# Patient Record
Sex: MALE
[De-identification: names, ages, dates, MRNs, and addresses within clinical notes are randomized per-mention and may not be internally consistent; named-entity substitution may affect disease eponyms.]

---

## 2021-02-05 PROBLEM — Z00.00 ENCOUNTER FOR PREVENTIVE HEALTH EXAMINATION: Status: ACTIVE | Noted: 2021-02-05

## 2021-02-05 PROCEDURE — 36415 COLL VENOUS BLD VENIPUNCTURE: CPT

## 2021-02-05 PROCEDURE — 99072 ADDL SUPL MATRL&STAF TM PHE: CPT

## 2023-08-15 ENCOUNTER — APPOINTMENT (OUTPATIENT)
Dept: PSYCHIATRY | Facility: CLINIC | Age: 33
End: 2023-08-15
Payer: COMMERCIAL

## 2023-08-15 DIAGNOSIS — Z86.59 PERSONAL HISTORY OF OTHER MENTAL AND BEHAVIORAL DISORDERS: ICD-10-CM

## 2023-08-15 DIAGNOSIS — Z78.9 OTHER SPECIFIED HEALTH STATUS: ICD-10-CM

## 2023-08-15 DIAGNOSIS — F12.90 CANNABIS USE, UNSPECIFIED, UNCOMPLICATED: ICD-10-CM

## 2023-08-15 DIAGNOSIS — Z83.49 FAMILY HISTORY OF OTHER ENDOCRINE, NUTRITIONAL AND METABOLIC DISEASES: ICD-10-CM

## 2023-08-15 PROCEDURE — 90791 PSYCH DIAGNOSTIC EVALUATION: CPT | Mod: 95

## 2023-08-15 NOTE — RISK ASSESSMENT
[Clinical Interview] : Clinical Interview [No] : No [Yes] : yes [de-identified] : Pt denies owning a gun

## 2023-08-22 ENCOUNTER — APPOINTMENT (OUTPATIENT)
Dept: PSYCHIATRY | Facility: CLINIC | Age: 33
End: 2023-08-22
Payer: COMMERCIAL

## 2023-08-22 PROCEDURE — 90837 PSYTX W PT 60 MINUTES: CPT | Mod: 95

## 2023-08-22 NOTE — REASON FOR VISIT
[Patient preference] : as per patient preference [Access issues (e.g., transportation, impaired mobility, etc.)] : due to patient's access issues [Telehealth (audio & video) - Individual/Group] : This visit was provided via telehealth using real-time 2-way audio visual technology. [Other Location: e.g. Home (Enter Location, City,State)___] : The provider was located at [unfilled]. [Home] : The patient, [unfilled], was located at home, [unfilled], at the time of the visit. [Verbal consent obtained from patient/other participant(s)] : Verbal consent for telehealth/telephonic services obtained from patient/other participant(s) [Patient] : Patient

## 2023-08-22 NOTE — PLAN
[FreeTextEntry2] : Complete ESTEBAN  ERP for Obsessive Compulsive Disorder  [Cognitive and/or Behavior Therapy] : Cognitive and/or Behavior Therapy  [de-identified] : Psychologist met with Pt using teletherapy platform. Pt was at their home, while psychologist was working remotely via [facility]. Pt continued to provide consent for telehealth services. Goals of the session were to continue ESTEBAN differential diagnostic interview and provide psychoeducation. Affect was observed to be full ranges, and Mood was reported as "okay." Pt was responsive, receptive, and engaged throughout session. Evidence of risk was neither observed nor reported.  Writer continued ESTEBAN-5 interview. Pt endorsed excessive, difficult to control worries specific to his wife's fidelity and daughter's paternity, accompanied by moderate to very severe restlessness, irritability, and muscle tension, although mild reassurance seeking. While worried, he reported feeling unable to separate from his family and commute to work, choosing to work remotely. He reported spending "100%" of the day worrying over the past month, and the worries had been present since 09/2022. These led to mile interference but severe to very severe distress. Pt also reported worries about separation from his mother during middle school, for fear that she would be harmed, as well as contamination fears throughout high school.   Pt reported unwanted obsessive "paranoid" sexual thoughts/images regarding his wife's infidelity, despite lack of evidence. He reported numerous behaviors to neutralize the worries, including chores, TV, and exercising 60-90 minutes daily to make sure he looked his best so his wife would not cheat.  triggers for obsessive thoughts included things being out of place at home. These worries occupied "100%" of his thoughts, and strength of the beliefs at 20%. Compulsions , including distractions, occupied "80%" of the day. Pt acknowledged that the obsessions and compulsions were nonsensical, and he was able to resist compulsions 20% of the time. Pt reported that food was a trigger for increased exercise, that if he did not, he worried that his wife would leave him. These symptoms consistent with OCD led to moderate to very severe interference and distress, beginning in 09/2022  Homework: Complete ASRS Adult ADHD questionnaire, and Y-BOCS  [FreeTextEntry1] : Continue ESTEBAN 5 with specific phobia section  for Obsessive Compulsive Disorder  Psychoeducation about Diagnosis & Treatment Model  1. Pt will learn about their symptoms and diagnosis and be able to explain it back to psychologist. 2. Pt will learn about the CBT model and be able to accurately categorize their symptoms and diagnosis in terms of cognition, emotional responses, behaviors, and physiological arousal.  Exposure Therapy Interventions 1. Pt can benefit from learning/continuing exposure therapy interventions, involving confronting situational, emotional, physiological, and cognitive avoided situations.  2. Pt can benefit from reducing and eliminating safety behaviors/objects, such as maladaptive response/ritualization prevention, removing "just in case" items that maintain anxiety, panic, obsessive, and post-traumatic symptoms.  3. Exposure therapy involves any or all of these modalities: in vivo, imaginal, interoceptive, Virtual Reality, written/narrative, and analog in vivo exposure 4. Exposures will be conducted within psychotherapy sessions and assigned for homework between sessions  Cognitive Therapy Interventions  1. Pt can benefit from learning & practicing cognitive restructuring, including learning to categorize automatic thoughts/cognitive distortions (e.g. shoulds, catastrophic thoughts, overgeneralizations), judgments, biases, and attributions.  2. Pt can benefit from using cognitive reframing to challenge cognitive distortions to increase flexibility in thinking. 3. Pt can benefit from using self-coping statements to encourage themselves and provide validation.  4. Cognitive restructuring will be learned and practiced within sessions, and assigned for homework between sessions. This will entail interventions such using mood monitoring and thought record forms, to challenge cognitive distortions in real time.

## 2023-08-29 ENCOUNTER — APPOINTMENT (OUTPATIENT)
Dept: PSYCHIATRY | Facility: CLINIC | Age: 33
End: 2023-08-29
Payer: COMMERCIAL

## 2023-08-29 PROCEDURE — 90837 PSYTX W PT 60 MINUTES: CPT | Mod: 95

## 2023-08-29 NOTE — PLAN
[Cognitive and/or Behavior Therapy] : Cognitive and/or Behavior Therapy  [FreeTextEntry2] : Complete ESTEBAN  ERP for Obsessive Compulsive Disorder  BAT vs. CBT for Depressive Symptoms [de-identified] : Psychologist met with Pt using teletherapy platform. Pt was at their home, while psychologist was working remotely via [facility]. Pt continued to provide consent for telehealth services. Goals of the session were to complete ESTEBAN differential diagnostic interview and provide psychoeducation. Affect was observed to be full ranged, and Mood was reported as "okay." Pt was responsive, receptive, and engaged throughout session. Evidence of risk was neither observed nor reported.  Writer reviewed homework with Pt, who completed the ASRS adult ADHD measure and YBOCS. Pt did not score in clinical range for either measure.   Writer completed ESTEBAN-5 interview. Pt denied symptoms of hypomania/theodore, psychosis, eating disorders, illness anxiety & somatoform disorders, PTSD, and specific phobias. Pt endorsed history of substance use, alcohol and marijuana, as well as Adderall, from 08/22 to 01/23, when depressive symptoms were the worst. Pt opined that his previous diagnosis of ADHD was not valid, and discontinued Adderall due to sleep disruption. Writer elicited further information about Pt's and wife's upbringing, cultural and Taoist affiliations, and division of labor in the home.  Psychoeducation provided about treatments for OCD and depression. Treatment planning begun.   Homework: consider treating OCD vs depression first.   [FreeTextEntry1] : for Obsessive Compulsive Disorder  Psychoeducation about Diagnosis & Treatment Model  1. Pt will learn about their symptoms and diagnosis and be able to explain it back to psychologist. 2. Pt will learn about the CBT model and be able to accurately categorize their symptoms and diagnosis in terms of cognition, emotional responses, behaviors, and physiological arousal.  Exposure Therapy Interventions 1. Pt can benefit from learning/continuing exposure therapy interventions, involving confronting situational, emotional, physiological, and cognitive avoided situations.  2. Pt can benefit from reducing and eliminating safety behaviors/objects, such as maladaptive response/ritualization prevention, removing "just in case" items that maintain anxiety, panic, obsessive, and post-traumatic symptoms.  3. Exposure therapy involves any or all of these modalities: in vivo, imaginal, interoceptive, Virtual Reality, written/narrative, and analog in vivo exposure 4. Exposures will be conducted within psychotherapy sessions and assigned for homework between sessions  Cognitive Therapy Interventions  1. Pt can benefit from learning & practicing cognitive restructuring, including learning to categorize automatic thoughts/cognitive distortions (e.g. shoulds, catastrophic thoughts, overgeneralizations), judgments, biases, and attributions.  2. Pt can benefit from using cognitive reframing to challenge cognitive distortions to increase flexibility in thinking. 3. Pt can benefit from using self-coping statements to encourage themselves and provide validation.  4. Cognitive restructuring will be learned and practiced within sessions, and assigned for homework between sessions. This will entail interventions such using mood monitoring and thought record forms, to challenge cognitive distortions in real time.      for Depressive symptoms Psychoeducation about Diagnosis & Treatment Model  1. Pt will learn about their symptoms and diagnosis and be able to explain it back to psychologist. 2. Pt will learn about the CBT model and be able to accurately categorize their symptoms and diagnosis in terms of cognition, emotional responses, behaviors, and physiological arousal.   Behavioral Activation 1. Pt can benefit from values clarification 2. Pt can benefit from increasing frequency, intensity, and duration of enjoyed and meaningful activities, such as socialization and recreational activities.  3. Pt will schedule enjoyable and meaningful activities on realistically frequent basis   Cognitive Therapy Interventions  1. Pt can benefit from learning & practicing cognitive restructuring, including learning to categorize automatic thoughts/cognitive distortions (e.g. shoulds, catastrophic thoughts, overgeneralizations), judgments, biases, and attributions.  2. Pt can benefit from using cognitive reframing to challenge cognitive distortions to increase flexibility in thinking. 3. Pt can benefit from using self-coping statements to encourage themselves and provide validation.  4. Cognitive restructuring will be learned and practiced within sessions, and assigned for homework between sessions. This will entail interventions such using mood monitoring and thought record forms, to challenge cognitive distortions in real time.

## 2023-08-30 NOTE — PSYCHOSOCIAL ASSESSMENT
[All substances negative except as specified below] : All substances negative except as specified below [_____] : Route: [unfilled] [FreeTextEntry1] : PT

## 2023-08-30 NOTE — DISCUSSION/SUMMARY
[FreeTextEntry1] : Psychologist engaged with Pt and built rapport, explaining confidentiality & its limits, assessment and psychotherapy processes & phases. Writer reviewed attendance policy  and benefits of regular CBT sessions. Answered Pt's questions about his training and experience, as well as limits of expertise. Explained that assessment did not guarantee treatment, if Pt's diagnosis was beyond scope of practice or provider's expertise. Provided brief psychoeducation about CBT model, and described it as a collaborative, empowering process, that gives Pt's tools with which to become their own therapist Explained that.CBT involves goal setting, subgoal analysis, and motivation building. Explained that both in session, didactics and skill building would occur, and that homework was an important component to implementation of CBT with real-world situations and stressors. Psychologist described treatment matching approach based on symptoms and collaboratively selecting best evidence-based treatments (EBT's) for Pt's diagnoses. Psychologist answered questions about assessment, psychotherapy, and CBT model/conceptualization.   Pt reported moderate to severe levels of depressive, anxiety, and OCD related symptoms on the DSM5 Cross Cutting Measure. He scored a 17 on the GAD7, severe anxiety, and an 11 on the PHQ9 (moderate depression).  Psychologist began administration of ESTEBAN-5 (Antonio & Mercy, 2014) differential diagnostic interview. Pt endorsed moderate to severe depressed mood, (irritability), loss of interest or pleasure, and difficulties thinking/concentrating/decision making. These were frequent and led to severe distress and interference. Although full dx criteria for Major Depressive Disorder were not met, conceptualized at Other Specific Depressive Disorder.   Pt denied current symptoms of Social Anxiety Disorder & separation anxiety disorder. He endorsed limited symptom panic attacks (usually <=3 symptoms) heart racing, chills/heat sensations, feeling detached, fear of losing control, and fear of going crazy). He reported ~20 LSA's per month for the past 6 months. Pt viewed these as peaks of anxiety as opposed to full panic attacks, denied worry about future attacks, and denied agoraphobic avoidance.   Plan: Complete ESTEBAN   For Depressive Disorder: Psychoeducation about Diagnosis & Treatment Model  1. Pt will learn about their symptoms and diagnosis and be able to explain it back to psychologist. 2. Pt will learn about the CBT model and be able to accurately categorize their symptoms and diagnosis in terms of cognition, emotional responses, behaviors, and physiological arousal.  Behavioral Activation 1. Pt can benefit from values clarification 2. Pt can benefit from increasing frequency, intensity, and duration of enjoyed and meaningful activities, such as socialization and recreational activities.  3. Pt will schedule enjoyable and meaningful activities on realistically frequent basis   Cognitive Therapy Interventions  1. Pt can benefit from learning & practicing cognitive restructuring, including learning to categorize automatic thoughts/cognitive distortions (e.g. shoulds, catastrophic thoughts, overgeneralizations), judgments, biases, and attributions.  2. Pt can benefit from using cognitive reframing to challenge cognitive distortions to increase flexibility in thinking. 3. Pt can benefit from using self-coping statements to encourage themselves and provide validation.  4. Cognitive restructuring will be learned and practiced within sessions, and assigned for homework between sessions. This will entail interventions such using mood monitoring and thought record forms, to challenge cognitive distortions in real time.

## 2023-08-30 NOTE — HISTORY OF PRESENT ILLNESS
[FreeTextEntry1] : Pt reports long history of anxiety dating back to age 13-14 in 8th grade, ~2003, concerns about mother's safety at the time, followed by fears of germs throughout high school. Most recently, from 11/23 to present, Pt reported fears of wife's infidelity and of losing daughter, despite complete absence of evidence.Pt also reported depressive symptoms, starting in 08/23.  Pt attributed current symptoms due to trying for second child.  [FreeTextEntry2] : ADHD, Adderall discontinued in 02/23; Wellbutrin since 05/23  Anxiety

## 2023-08-30 NOTE — REASON FOR VISIT
[Patient preference] : as per patient preference [Telehealth (audio & video) - Individual/Group] : This visit was provided via telehealth using real-time 2-way audio visual technology. [Other Location: e.g. Home (Enter Location, City,State)___] : The provider was located at [unfilled]. [Home] : The patient, [unfilled], was located at home, [unfilled], at the time of the visit. [Verbal consent obtained from patient/other participant(s)] : Verbal consent for telehealth/telephonic services obtained from patient/other participant(s) [Not Applicable] : Not Applicable [Patient] : Patient [FreeTextEntry4] : 4:00 [FreeTextEntry5] : 5:00 [FreeTextEntry2] : "Anxiety & Depression."

## 2023-09-05 ENCOUNTER — APPOINTMENT (OUTPATIENT)
Dept: PSYCHIATRY | Facility: CLINIC | Age: 33
End: 2023-09-05
Payer: COMMERCIAL

## 2023-09-05 PROCEDURE — 90837 PSYTX W PT 60 MINUTES: CPT | Mod: 95

## 2023-09-05 NOTE — PLAN
[Cognitive and/or Behavior Therapy] : Cognitive and/or Behavior Therapy  [FreeTextEntry2] : ERP for Obsessive Compulsive Disorder  BAT vs. CBT for Depressive Symptoms [de-identified] : Psychologist met with Pt using teletherapy platform. Pt was at their home, while psychologist was working remotely via [facility]. Pt continued to provide consent for telehealth services. Goals of the session were to conduct treatment planning, provide psychoeducation, and begin fear hierarchy. Affect was observed to be anxious and Mood was reported as "okay." Pt was responsive, receptive, and engaged throughout session. Evidence of risk was neither observed nor reported.  Writer continued treatment planning with Pt. Pt chose to focus on OCD over depressive symptoms. Psychoeducation provided about CBT model of mood regulation, and Pt provided applications to his experiences with OCD and depression. Psychoeducation provided regarding OCD, it's symptoms, and function of compulsions (escape/avoidance) regarding relief from anxiety & disgust. Concepts of thought action fusion & opposite action introduced. Fear hierarchy construction began regarding triggers for obsessions and compulsions.  Homework: purchase treatments that work workbook on ERP for OCD (Darrion, 2012); add items to fear hierarchy  [FreeTextEntry1] : for Obsessive Compulsive Disorder  Psychoeducation about Diagnosis & Treatment Model  1. Pt will learn about their symptoms and diagnosis and be able to explain it back to psychologist. 2. Pt will learn about the CBT model and be able to accurately categorize their symptoms and diagnosis in terms of cognition, emotional responses, behaviors, and physiological arousal.  Exposure Therapy Interventions 1. Pt can benefit from learning/continuing exposure therapy interventions, involving confronting situational, emotional, physiological, and cognitive avoided situations.  2. Pt can benefit from reducing and eliminating safety behaviors/objects, such as maladaptive response/ritualization prevention, removing "just in case" items that maintain anxiety, panic, obsessive, and post-traumatic symptoms.  3. Exposure therapy involves any or all of these modalities: in vivo, imaginal, interoceptive, Virtual Reality, written/narrative, and analog in vivo exposure 4. Exposures will be conducted within psychotherapy sessions and assigned for homework between sessions  Cognitive Therapy Interventions  1. Pt can benefit from learning & practicing cognitive restructuring, including learning to categorize automatic thoughts/cognitive distortions (e.g. shoulds, catastrophic thoughts, overgeneralizations), judgments, biases, and attributions.  2. Pt can benefit from using cognitive reframing to challenge cognitive distortions to increase flexibility in thinking. 3. Pt can benefit from using self-coping statements to encourage themselves and provide validation.  4. Cognitive restructuring will be learned and practiced within sessions, and assigned for homework between sessions. This will entail interventions such using mood monitoring and thought record forms, to challenge cognitive distortions in real time.      for Depressive symptoms Psychoeducation about Diagnosis & Treatment Model  1. Pt will learn about their symptoms and diagnosis and be able to explain it back to psychologist. 2. Pt will learn about the CBT model and be able to accurately categorize their symptoms and diagnosis in terms of cognition, emotional responses, behaviors, and physiological arousal.   Behavioral Activation 1. Pt can benefit from values clarification 2. Pt can benefit from increasing frequency, intensity, and duration of enjoyed and meaningful activities, such as socialization and recreational activities.  3. Pt will schedule enjoyable and meaningful activities on realistically frequent basis   Cognitive Therapy Interventions  1. Pt can benefit from learning & practicing cognitive restructuring, including learning to categorize automatic thoughts/cognitive distortions (e.g. shoulds, catastrophic thoughts, overgeneralizations), judgments, biases, and attributions.  2. Pt can benefit from using cognitive reframing to challenge cognitive distortions to increase flexibility in thinking. 3. Pt can benefit from using self-coping statements to encourage themselves and provide validation.  4. Cognitive restructuring will be learned and practiced within sessions, and assigned for homework between sessions. This will entail interventions such using mood monitoring and thought record forms, to challenge cognitive distortions in real time.

## 2023-09-05 NOTE — PLAN
[Cognitive and/or Behavior Therapy] : Cognitive and/or Behavior Therapy  [FreeTextEntry2] : ERP for Obsessive Compulsive Disorder  BAT vs. CBT for Depressive Symptoms [de-identified] : Psychologist met with Pt using teletherapy platform. Pt was at their home, while psychologist was working remotely via [facility]. Pt continued to provide consent for telehealth services. Goals of the session were to conduct treatment planning, provide psychoeducation, and begin fear hierarchy. Affect was observed to be anxious and Mood was reported as "okay." Pt was responsive, receptive, and engaged throughout session. Evidence of risk was neither observed nor reported.  Writer continued treatment planning with Pt. Pt chose to focus on OCD over depressive symptoms. Psychoeducation provided about CBT model of mood regulation, and Pt provided applications to his experiences with OCD and depression. Psychoeducation provided regarding OCD, it's symptoms, and function of compulsions (escape/avoidance) regarding relief from anxiety & disgust. Concepts of thought action fusion & opposite action introduced. Fear hierarchy construction began regarding triggers for obsessions and compulsions.  Homework: purchase treatments that work workbook on ERP for OCD (Darrion, 2012); add items to fear hierarchy  [FreeTextEntry1] : for Obsessive Compulsive Disorder  Psychoeducation about Diagnosis & Treatment Model  1. Pt will learn about their symptoms and diagnosis and be able to explain it back to psychologist. 2. Pt will learn about the CBT model and be able to accurately categorize their symptoms and diagnosis in terms of cognition, emotional responses, behaviors, and physiological arousal.  Exposure Therapy Interventions 1. Pt can benefit from learning/continuing exposure therapy interventions, involving confronting situational, emotional, physiological, and cognitive avoided situations.  2. Pt can benefit from reducing and eliminating safety behaviors/objects, such as maladaptive response/ritualization prevention, removing "just in case" items that maintain anxiety, panic, obsessive, and post-traumatic symptoms.  3. Exposure therapy involves any or all of these modalities: in vivo, imaginal, interoceptive, Virtual Reality, written/narrative, and analog in vivo exposure 4. Exposures will be conducted within psychotherapy sessions and assigned for homework between sessions  Cognitive Therapy Interventions  1. Pt can benefit from learning & practicing cognitive restructuring, including learning to categorize automatic thoughts/cognitive distortions (e.g. shoulds, catastrophic thoughts, overgeneralizations), judgments, biases, and attributions.  2. Pt can benefit from using cognitive reframing to challenge cognitive distortions to increase flexibility in thinking. 3. Pt can benefit from using self-coping statements to encourage themselves and provide validation.  4. Cognitive restructuring will be learned and practiced within sessions, and assigned for homework between sessions. This will entail interventions such using mood monitoring and thought record forms, to challenge cognitive distortions in real time.      for Depressive symptoms Psychoeducation about Diagnosis & Treatment Model  1. Pt will learn about their symptoms and diagnosis and be able to explain it back to psychologist. 2. Pt will learn about the CBT model and be able to accurately categorize their symptoms and diagnosis in terms of cognition, emotional responses, behaviors, and physiological arousal.   Behavioral Activation 1. Pt can benefit from values clarification 2. Pt can benefit from increasing frequency, intensity, and duration of enjoyed and meaningful activities, such as socialization and recreational activities.  3. Pt will schedule enjoyable and meaningful activities on realistically frequent basis   Cognitive Therapy Interventions  1. Pt can benefit from learning & practicing cognitive restructuring, including learning to categorize automatic thoughts/cognitive distortions (e.g. shoulds, catastrophic thoughts, overgeneralizations), judgments, biases, and attributions.  2. Pt can benefit from using cognitive reframing to challenge cognitive distortions to increase flexibility in thinking. 3. Pt can benefit from using self-coping statements to encourage themselves and provide validation.  4. Cognitive restructuring will be learned and practiced within sessions, and assigned for homework between sessions. This will entail interventions such using mood monitoring and thought record forms, to challenge cognitive distortions in real time.

## 2023-09-12 ENCOUNTER — APPOINTMENT (OUTPATIENT)
Dept: PSYCHIATRY | Facility: CLINIC | Age: 33
End: 2023-09-12

## 2023-09-18 ENCOUNTER — APPOINTMENT (OUTPATIENT)
Dept: HUMAN REPRODUCTION | Facility: CLINIC | Age: 33
End: 2023-09-18
Payer: COMMERCIAL

## 2023-09-18 PROCEDURE — 89322 SEMEN ANAL STRICT CRITERIA: CPT

## 2023-09-19 ENCOUNTER — APPOINTMENT (OUTPATIENT)
Dept: PSYCHIATRY | Facility: CLINIC | Age: 33
End: 2023-09-19
Payer: COMMERCIAL

## 2023-09-19 PROCEDURE — 90837 PSYTX W PT 60 MINUTES: CPT | Mod: 95

## 2023-09-26 ENCOUNTER — APPOINTMENT (OUTPATIENT)
Dept: PSYCHIATRY | Facility: CLINIC | Age: 33
End: 2023-09-26
Payer: COMMERCIAL

## 2023-09-26 PROCEDURE — 90837 PSYTX W PT 60 MINUTES: CPT | Mod: GT

## 2023-10-10 ENCOUNTER — APPOINTMENT (OUTPATIENT)
Dept: PSYCHIATRY | Facility: CLINIC | Age: 33
End: 2023-10-10
Payer: COMMERCIAL

## 2023-10-10 DIAGNOSIS — F32.89 OTHER SPECIFIED DEPRESSIVE EPISODES: ICD-10-CM

## 2023-10-10 DIAGNOSIS — F42.9 OBSESSIVE-COMPULSIVE DISORDER, UNSPECIFIED: ICD-10-CM

## 2023-10-10 PROCEDURE — 90837 PSYTX W PT 60 MINUTES: CPT | Mod: GT

## 2023-10-17 ENCOUNTER — APPOINTMENT (OUTPATIENT)
Dept: PSYCHIATRY | Facility: CLINIC | Age: 33
End: 2023-10-17

## 2023-10-24 ENCOUNTER — APPOINTMENT (OUTPATIENT)
Dept: PSYCHIATRY | Facility: CLINIC | Age: 33
End: 2023-10-24

## 2023-10-31 ENCOUNTER — APPOINTMENT (OUTPATIENT)
Dept: PSYCHIATRY | Facility: CLINIC | Age: 33
End: 2023-10-31

## 2023-11-07 ENCOUNTER — APPOINTMENT (OUTPATIENT)
Dept: PSYCHIATRY | Facility: CLINIC | Age: 33
End: 2023-11-07

## 2023-11-14 ENCOUNTER — APPOINTMENT (OUTPATIENT)
Dept: PSYCHIATRY | Facility: CLINIC | Age: 33
End: 2023-11-14

## 2023-11-21 ENCOUNTER — APPOINTMENT (OUTPATIENT)
Dept: PSYCHIATRY | Facility: CLINIC | Age: 33
End: 2023-11-21

## 2023-11-28 ENCOUNTER — APPOINTMENT (OUTPATIENT)
Dept: PSYCHIATRY | Facility: CLINIC | Age: 33
End: 2023-11-28

## 2024-05-26 ENCOUNTER — NON-APPOINTMENT (OUTPATIENT)
Age: 34
End: 2024-05-26